# Patient Record
Sex: MALE | Race: WHITE | NOT HISPANIC OR LATINO | Employment: OTHER | URBAN - METROPOLITAN AREA
[De-identification: names, ages, dates, MRNs, and addresses within clinical notes are randomized per-mention and may not be internally consistent; named-entity substitution may affect disease eponyms.]

---

## 2019-03-18 DIAGNOSIS — M79.671 BILATERAL FOOT PAIN: Primary | ICD-10-CM

## 2019-03-18 DIAGNOSIS — M79.672 BILATERAL FOOT PAIN: Primary | ICD-10-CM

## 2019-03-20 ENCOUNTER — HOSPITAL ENCOUNTER (OUTPATIENT)
Dept: RADIOLOGY | Facility: HOSPITAL | Age: 79
Discharge: HOME | End: 2019-03-20
Attending: ORTHOPAEDIC SURGERY
Payer: MEDICARE

## 2019-03-20 ENCOUNTER — OFFICE VISIT (OUTPATIENT)
Dept: ORTHOPEDICS | Facility: CLINIC | Age: 79
End: 2019-03-20
Payer: MEDICARE

## 2019-03-20 VITALS — WEIGHT: 185 LBS | BODY MASS INDEX: 25.9 KG/M2 | HEIGHT: 71 IN

## 2019-03-20 DIAGNOSIS — M79.672 BILATERAL FOOT PAIN: ICD-10-CM

## 2019-03-20 DIAGNOSIS — G57.63 MORTON'S NEUROMA OF BOTH FEET: ICD-10-CM

## 2019-03-20 DIAGNOSIS — M19.079 ARTHRITIS OF FOOT: Primary | ICD-10-CM

## 2019-03-20 DIAGNOSIS — M79.671 BILATERAL FOOT PAIN: ICD-10-CM

## 2019-03-20 PROCEDURE — 99203 PR OFFICE/OUTPT VISIT, NEW, LEVL III, 30-44 MIN: ICD-10-PCS | Mod: S$GLB,,, | Performed by: ORTHOPAEDIC SURGERY

## 2019-03-20 PROCEDURE — 73630 X-RAY EXAM OF FOOT: CPT | Mod: 26,50,, | Performed by: RADIOLOGY

## 2019-03-20 PROCEDURE — 73630 X-RAY EXAM OF FOOT: CPT | Mod: 50,TC,PN

## 2019-03-20 PROCEDURE — 99203 OFFICE O/P NEW LOW 30 MIN: CPT | Mod: S$GLB,,, | Performed by: ORTHOPAEDIC SURGERY

## 2019-03-20 PROCEDURE — 73630 XR FOOT COMPLETE 3 VIEW BILATERAL: ICD-10-PCS | Mod: 26,50,, | Performed by: RADIOLOGY

## 2019-03-20 PROCEDURE — 1101F PT FALLS ASSESS-DOCD LE1/YR: CPT | Mod: CPTII,S$GLB,, | Performed by: ORTHOPAEDIC SURGERY

## 2019-03-20 PROCEDURE — 99999 PR PBB SHADOW E&M-EST. PATIENT-LVL II: CPT | Mod: PBBFAC,,, | Performed by: ORTHOPAEDIC SURGERY

## 2019-03-20 PROCEDURE — 99999 PR PBB SHADOW E&M-EST. PATIENT-LVL II: ICD-10-PCS | Mod: PBBFAC,,, | Performed by: ORTHOPAEDIC SURGERY

## 2019-03-20 PROCEDURE — 1101F PR PT FALLS ASSESS DOC 0-1 FALLS W/OUT INJ PAST YR: ICD-10-PCS | Mod: CPTII,S$GLB,, | Performed by: ORTHOPAEDIC SURGERY

## 2019-03-20 RX ORDER — ASPIRIN 81 MG/1
81 TABLET ORAL DAILY
COMMUNITY

## 2019-03-20 RX ORDER — AMLODIPINE AND BENAZEPRIL HYDROCHLORIDE 10; 20 MG/1; MG/1
CAPSULE ORAL
COMMUNITY
Start: 2019-01-23

## 2019-03-20 RX ORDER — HYDROCHLOROTHIAZIDE 12.5 MG/1
25 CAPSULE ORAL DAILY
COMMUNITY
Start: 2019-01-23

## 2019-03-20 RX ORDER — ATORVASTATIN CALCIUM 40 MG/1
40 TABLET, FILM COATED ORAL DAILY
COMMUNITY
Start: 2019-01-23

## 2019-03-20 RX ORDER — METOPROLOL TARTRATE 25 MG/1
25 TABLET, FILM COATED ORAL 2 TIMES DAILY
COMMUNITY
Start: 2019-01-23

## 2019-03-20 RX ORDER — DOCUSATE SODIUM 100 MG/1
100 CAPSULE, LIQUID FILLED ORAL 2 TIMES DAILY
COMMUNITY

## 2019-03-20 NOTE — PROGRESS NOTES
"  Subjective:      Patient ID: Yariel Maguire is a 78 y.o. male.    Chief Complaint: Pain of the Left Foot and Pain of the Right Foot    Referring Provider: Aaareferral Self  No address on file    HPI:  Mr. Maguire is extremely angry argumentative 78-year-old male who presented today for evaluation of bilateral foot pain which began approximately 2 years a grow and increased in intensity over the last 6 months.  He stated his left foot equals his right.  His symptoms are insidious onset.  He stated, it feels like there is a bone loose when it happens."  His pain is not constant it intermittent but when he has an episode it inhibits ambulation ability.  Walking increases his symptoms while nothing seems to improve it. He has not taken NSAIDs but was given a steroid Dosepak which helped.  He has not done physical therapy, worn braces are arch supports, nor had injections.    Past Medical History:   Diagnosis Date    Constipation      *  *  *  * Hypertension  Nephrolithiasis  Headaches  Coronary artery disease  Meniere's disease      Past Surgical History:   Procedure Laterality Date    COLONOSCOPY      EXCISIONAL HEMORRHOIDECTOMY      GASTRIC BYPASS      SHOULDER ARTHROSCOPY Left      *  *  * Rotator Cuff  THREE VESSEL CABG  BILATERAL CATARACT REMOVAL  SECOND AND 3RD TOE HAMMERTOE CORRECTION RIGHT FOOT       Review of patient's allergies indicates:   Allergen Reactions    Potassium        Social History     Occupational History    Semi retired /entertainer     Tobacco Use    Smoking status: Never Smoker    Smokeless tobacco: Never Used   Substance and Sexual Activity    Alcohol use: No     Frequency: Never    Drug use: No    Sexual activity: Yes     Partners: Female      Family History   Problem Relation Age of Onset    Cancer Mother     Alcohol abuse Father     Lung cancer Brother     Obesity Brother     Arthritis Brother     Drug abuse Brother     Lung cancer Son     Mental illness Daughter "     Schizophrenia Daughter     Bipolar disorder Daughter     Arthritis Daughter        Previous Hospitalizations:  3 vessel CABG, hemorrhoidectomy.    ROS:   Review of Systems   Constitution: Negative for chills and fever.   HENT: Negative for congestion.    Eyes: Negative for blurred vision.   Cardiovascular: Negative for chest pain.   Respiratory: Negative for cough.    Endocrine: Negative for polydipsia.   Hematologic/Lymphatic: Does not bruise/bleed easily.   Skin: Negative for skin cancer.   Musculoskeletal: Negative for gout.   Gastrointestinal: Negative for constipation, diarrhea and heartburn.   Genitourinary: Negative for nocturia.   Neurological: Positive for headaches. Negative for seizures.   Psychiatric/Behavioral: Negative for depression.   Allergic/Immunologic: Negative for environmental allergies.           Objective:      Physical Exam:   General:  Angry, argumentative, AAOx3.  No acute distress  HEENT: Normocephalic, PEARLA EOMI, fair dentition  Neck: Supple, No JVD  Chest: Symetric, equal excursion on inspiration  Abdomen: Soft NTND  Vascular:  Pulses intact and equal bilaterally.  Capillary refill less than 3 seconds and equal bilaterally  Neurologic:  Pinprick and soft touch intact and equal bilaterally  Integment:  No ecchymosis, no errythema  Extremity:  Ankle/foot:  Dorsiflexion/plantar flexion equal bilaterally 20/30 degrees. Inversion/eversion equal bilaterally. Nontender at the ATFL bilateral ankles.  Nontender over the deltoid ligament bilateral ankles.  Equal excursion with forced adduction both ankles.  Drawer equal bilaterally. Nontender at the Lisfranc interval both feet.  Tender at the metatarsals heads 1st interspace, right foot and 2nd interspace left foot. Mild swelling at the metatarsal heads at the 1st interspace right foot and 2nd interspace left foot. Positive squeeze test bilaterally. When last negative bilaterally.  Nontender at the plantar fascia insertion on the  Achilles both feet.  Radiography:  Personally reviewed x-rays of the left foot completed on 03/20/2019 which showed sesamoid arthritis and talonavicular arthritis with a bone spur there is also calcification of the dorsalis pedis artery.      Assessment:       Impression:      1.  2.  3. 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  Barcenas's neuroma 2nd interspace, left foot.  Foot arthritis.         Plan:       1.  Discussed physical examination and radiographic findings with the patient. Yariel understands that he has Barcenas's neuromas and with conservative management he should improve.  The patient was are very argumentative.   2.  Offered a steroid injection to the Barcenas's neuromas, he declined.  3.  Recommend the patient purchase arch supports in where the min issues.  4.  Recommend the patient purchase wide toed shoes and avoid wearing shoes such as cowboy boots.  5.  Take NSAIDs as tolerated and allowed by PCM.  6.  Home exercises to include Aspercreme massages were discussed.  7.  Offered referral to physical therapy declined.  8.  [facility] portal was discussed with the patient and information was given.  The patient was encouraged to use the portal for future encounters.  9.  Follow up p.r.n.

## 2019-09-19 DIAGNOSIS — I25.10 CORONARY ARTERY DISEASE, ANGINA PRESENCE UNSPECIFIED, UNSPECIFIED VESSEL OR LESION TYPE, UNSPECIFIED WHETHER NATIVE OR TRANSPLANTED HEART: ICD-10-CM

## 2019-09-19 DIAGNOSIS — I35.0 SEVERE AORTIC STENOSIS: Primary | ICD-10-CM

## 2019-11-18 ENCOUNTER — HOSPITAL ENCOUNTER (OUTPATIENT)
Dept: CARDIOLOGY | Facility: CLINIC | Age: 79
Discharge: HOME | End: 2019-11-18
Attending: INTERNAL MEDICINE
Payer: MEDICARE

## 2019-11-18 ENCOUNTER — HOSPITAL ENCOUNTER (OUTPATIENT)
Dept: RADIOLOGY | Facility: HOSPITAL | Age: 79
Discharge: HOME | End: 2019-11-18
Attending: INTERNAL MEDICINE
Payer: MEDICARE

## 2019-11-18 ENCOUNTER — OFFICE VISIT (OUTPATIENT)
Dept: CARDIOLOGY | Facility: CLINIC | Age: 79
End: 2019-11-18
Payer: MEDICARE

## 2019-11-18 ENCOUNTER — HOSPITAL ENCOUNTER (OUTPATIENT)
Dept: CARDIOLOGY | Facility: CLINIC | Age: 79
Discharge: HOME | End: 2019-11-18
Payer: MEDICARE

## 2019-11-18 ENCOUNTER — HOSPITAL ENCOUNTER (OUTPATIENT)
Dept: PULMONOLOGY | Facility: CLINIC | Age: 79
Discharge: HOME | End: 2019-11-18
Payer: MEDICARE

## 2019-11-18 VITALS
WEIGHT: 190.25 LBS | BODY MASS INDEX: 26.64 KG/M2 | OXYGEN SATURATION: 99 % | HEART RATE: 63 BPM | DIASTOLIC BLOOD PRESSURE: 81 MMHG | SYSTOLIC BLOOD PRESSURE: 167 MMHG | HEIGHT: 71 IN

## 2019-11-18 VITALS — BODY MASS INDEX: 27.85 KG/M2 | HEIGHT: 69 IN | WEIGHT: 188 LBS

## 2019-11-18 VITALS
WEIGHT: 188 LBS | HEIGHT: 69 IN | HEART RATE: 64 BPM | SYSTOLIC BLOOD PRESSURE: 160 MMHG | BODY MASS INDEX: 27.85 KG/M2 | DIASTOLIC BLOOD PRESSURE: 80 MMHG

## 2019-11-18 DIAGNOSIS — I50.32 CHRONIC DIASTOLIC HEART FAILURE: ICD-10-CM

## 2019-11-18 DIAGNOSIS — I35.0 SEVERE AORTIC STENOSIS: Primary | ICD-10-CM

## 2019-11-18 DIAGNOSIS — I35.0 SEVERE AORTIC STENOSIS: ICD-10-CM

## 2019-11-18 DIAGNOSIS — I25.10 CORONARY ARTERY DISEASE, ANGINA PRESENCE UNSPECIFIED, UNSPECIFIED VESSEL OR LESION TYPE, UNSPECIFIED WHETHER NATIVE OR TRANSPLANTED HEART: ICD-10-CM

## 2019-11-18 DIAGNOSIS — I10 ESSENTIAL HYPERTENSION: ICD-10-CM

## 2019-11-18 DIAGNOSIS — E78.2 MIXED HYPERLIPIDEMIA: ICD-10-CM

## 2019-11-18 LAB
ASCENDING AORTA: 3.57 CM
AV INDEX (PROSTH): 0.17
AV MEAN GRADIENT: 43 MMHG
AV PEAK GRADIENT: 74 MMHG
AV VALVE AREA: 0.66 CM2
AV VELOCITY RATIO: 0.23
BSA FOR ECHO PROCEDURE: 2.04 M2
CV ECHO LV RWT: 0.45 CM
DLCO ADJ PRE: 21.21 ML/(MIN*MMHG) (ref 17.75–31.61)
DLCO SINGLE BREATH LLN: 17.75
DLCO SINGLE BREATH PRE REF: 85.9 %
DLCO SINGLE BREATH REF: 24.68
DLCOC SBVA LLN: 2.4
DLCOC SBVA PRE REF: 89.2 %
DLCOC SBVA REF: 3.58
DLCOC SINGLE BREATH LLN: 17.75
DLCOC SINGLE BREATH PRE REF: 85.9 %
DLCOC SINGLE BREATH REF: 24.68
DLCOCSBVAULN: 4.75
DLCOCSINGLEBREATHULN: 31.61
DLCOSINGLEBREATHULN: 31.61
DLCOVA LLN: 2.4
DLCOVA PRE REF: 89.2 %
DLCOVA PRE: 3.19 ML/(MIN*MMHG*L) (ref 2.4–4.75)
DLCOVA REF: 3.58
DLCOVAULN: 4.75
DLVAADJ PRE: 3.19 ML/(MIN*MMHG*L) (ref 2.4–4.75)
DOP CALC AO PEAK VEL: 4.3 M/S
DOP CALC AO VTI: 101.4 CM
DOP CALC LVOT AREA: 3.8 CM2
DOP CALC LVOT DIAMETER: 2.2 CM
DOP CALC LVOT PEAK VEL: 0.98 M/S
DOP CALC LVOT STROKE VOLUME: 66.49 CM3
DOP CALCLVOT PEAK VEL VTI: 17.5 CM
E WAVE DECELERATION TIME: 168.86 MSEC
E/A RATIO: 1.35
E/E' RATIO: 17 M/S
ECHO LV POSTERIOR WALL: 1.14 CM (ref 0.6–1.1)
FEF 25 75 LLN: 0.78
FEF 25 75 PRE REF: 128.1 %
FEF 25 75 REF: 2.03
FEV05 LLN: 1.27
FEV05 REF: 2.4
FEV1 FVC LLN: 60
FEV1 FVC PRE REF: 102.1 %
FEV1 FVC REF: 75
FEV1 LLN: 1.97
FEV1 PRE REF: 110 %
FEV1 REF: 2.82
FRACTIONAL SHORTENING: 28 % (ref 28–44)
FVC LLN: 2.75
FVC PRE REF: 107 %
FVC REF: 3.79
INTERVENTRICULAR SEPTUM: 1.26 CM (ref 0.6–1.1)
IVC PRE: 4.05 L (ref 2.75–4.84)
IVC SINGLE BREATH LLN: 2.75
IVC SINGLE BREATH PRE REF: 107 %
IVC SINGLE BREATH REF: 3.79
IVCSINGLEBREATHULN: 4.84
LA MAJOR: 6.52 CM
LA MINOR: 6.57 CM
LA WIDTH: 5.71 CM
LEFT ATRIUM SIZE: 6.4 CM
LEFT ATRIUM VOLUME INDEX: 101 ML/M2
LEFT ATRIUM VOLUME: 203.3 CM3
LEFT INTERNAL DIMENSION IN SYSTOLE: 3.64 CM (ref 2.1–4)
LEFT VENTRICLE DIASTOLIC VOLUME INDEX: 60.85 ML/M2
LEFT VENTRICLE DIASTOLIC VOLUME: 122.43 ML
LEFT VENTRICLE MASS INDEX: 119 G/M2
LEFT VENTRICLE SYSTOLIC VOLUME INDEX: 27.8 ML/M2
LEFT VENTRICLE SYSTOLIC VOLUME: 55.91 ML
LEFT VENTRICULAR INTERNAL DIMENSION IN DIASTOLE: 5.08 CM (ref 3.5–6)
LEFT VENTRICULAR MASS: 239.73 G
LV LATERAL E/E' RATIO: 13.6 M/S
LV SEPTAL E/E' RATIO: 22.67 M/S
MV PEAK A VEL: 1.01 M/S
MV PEAK E VEL: 1.36 M/S
MVV LLN: 94
MVV PRE REF: 115.4 %
MVV REF: 111
PEF LLN: 4.99
PEF PRE REF: 123.6 %
PEF REF: 7.23
PISA TR MAX VEL: 2.61 M/S
PRE DLCO: 21.21 ML/(MIN*MMHG) (ref 17.75–31.61)
PRE FEF 25 75: 2.6 L/S (ref 0.78–3.27)
PRE FET 100: 7.04 SEC
PRE FEV05 REF: 106.5 %
PRE FEV1 FVC: 76.55 % (ref 60.41–89.62)
PRE FEV1: 3.1 L (ref 1.97–3.68)
PRE FEV5: 2.56 L (ref 1.27–3.54)
PRE FVC: 4.06 L (ref 2.75–4.84)
PRE MVV: 128 L/MIN (ref 94.28–127.56)
PRE PEF: 8.94 L/S (ref 4.99–9.48)
PULM VEIN S/D RATIO: 1.04
PV PEAK D VEL: 0.48 M/S
PV PEAK S VEL: 0.5 M/S
RA MAJOR: 4.76 CM
RA PRESSURE: 3 MMHG
RA WIDTH: 3.28 CM
RIGHT VENTRICULAR END-DIASTOLIC DIMENSION: 2.79 CM
SINUS: 3.51 CM
STJ: 3.04 CM
TDI LATERAL: 0.1 M/S
TDI SEPTAL: 0.06 M/S
TDI: 0.08 M/S
TR MAX PG: 27 MMHG
TRICUSPID ANNULAR PLANE SYSTOLIC EXCURSION: 1 CM
TV REST PULMONARY ARTERY PRESSURE: 30 MMHG
VA PRE: 6.66 L (ref 6.75–6.75)
VA SINGLE BREATH LLN: 6.75
VA SINGLE BREATH PRE REF: 98.6 %
VA SINGLE BREATH REF: 6.75
VASINGLEBREATHULN: 6.75

## 2019-11-18 PROCEDURE — 74174 CTA CARDIAC TAVR_PARTNERS (XPD): ICD-10-PCS | Mod: 26,,, | Performed by: RADIOLOGY

## 2019-11-18 PROCEDURE — 94618 PULMONARY STRESS TESTING: ICD-10-PCS | Mod: S$GLB,,, | Performed by: INTERNAL MEDICINE

## 2019-11-18 PROCEDURE — 99205 OFFICE O/P NEW HI 60 MIN: CPT | Mod: S$GLB,,, | Performed by: THORACIC SURGERY (CARDIOTHORACIC VASCULAR SURGERY)

## 2019-11-18 PROCEDURE — 74174 CTA ABD&PLVS W/CONTRAST: CPT | Mod: 26,,, | Performed by: RADIOLOGY

## 2019-11-18 PROCEDURE — 1101F PT FALLS ASSESS-DOCD LE1/YR: CPT | Mod: CPTII,S$GLB,, | Performed by: THORACIC SURGERY (CARDIOTHORACIC VASCULAR SURGERY)

## 2019-11-18 PROCEDURE — 71275 CT ANGIOGRAPHY CHEST: CPT | Mod: TC

## 2019-11-18 PROCEDURE — 93306 TTE W/DOPPLER COMPLETE: CPT | Mod: S$GLB,,, | Performed by: INTERNAL MEDICINE

## 2019-11-18 PROCEDURE — 99999 PR PBB SHADOW E&M-EST. PATIENT-LVL III: CPT | Mod: PBBFAC,,,

## 2019-11-18 PROCEDURE — 99204 OFFICE O/P NEW MOD 45 MIN: CPT | Mod: S$GLB,,, | Performed by: INTERNAL MEDICINE

## 2019-11-18 PROCEDURE — 1101F PT FALLS ASSESS-DOCD LE1/YR: CPT | Mod: CPTII,S$GLB,, | Performed by: INTERNAL MEDICINE

## 2019-11-18 PROCEDURE — 99204 PR OFFICE/OUTPT VISIT, NEW, LEVL IV, 45-59 MIN: ICD-10-PCS | Mod: S$GLB,,, | Performed by: INTERNAL MEDICINE

## 2019-11-18 PROCEDURE — 93306 ECHO (CUPID ONLY): ICD-10-PCS | Mod: S$GLB,,, | Performed by: INTERNAL MEDICINE

## 2019-11-18 PROCEDURE — 93010 ELECTROCARDIOGRAM REPORT: CPT | Mod: S$GLB,,, | Performed by: INTERNAL MEDICINE

## 2019-11-18 PROCEDURE — 71275 CTA CARDIAC TAVR_PARTNERS (XPD): ICD-10-PCS | Mod: 26,,, | Performed by: RADIOLOGY

## 2019-11-18 PROCEDURE — 94010 BREATHING CAPACITY TEST: ICD-10-PCS | Mod: S$GLB,,, | Performed by: INTERNAL MEDICINE

## 2019-11-18 PROCEDURE — 93005 ELECTROCARDIOGRAM TRACING: CPT | Mod: S$GLB,,, | Performed by: INTERNAL MEDICINE

## 2019-11-18 PROCEDURE — 94729 DIFFUSING CAPACITY: CPT | Mod: S$GLB,,, | Performed by: INTERNAL MEDICINE

## 2019-11-18 PROCEDURE — 93010 EKG 12-LEAD: ICD-10-PCS | Mod: S$GLB,,, | Performed by: INTERNAL MEDICINE

## 2019-11-18 PROCEDURE — 1101F PR PT FALLS ASSESS DOC 0-1 FALLS W/OUT INJ PAST YR: ICD-10-PCS | Mod: CPTII,S$GLB,, | Performed by: INTERNAL MEDICINE

## 2019-11-18 PROCEDURE — 99205 PR OFFICE/OUTPT VISIT, NEW, LEVL V, 60-74 MIN: ICD-10-PCS | Mod: S$GLB,,, | Performed by: THORACIC SURGERY (CARDIOTHORACIC VASCULAR SURGERY)

## 2019-11-18 PROCEDURE — 94618 PULMONARY STRESS TESTING: CPT | Mod: S$GLB,,, | Performed by: INTERNAL MEDICINE

## 2019-11-18 PROCEDURE — 25500020 PHARM REV CODE 255: Performed by: INTERNAL MEDICINE

## 2019-11-18 PROCEDURE — 99999 PR PBB SHADOW E&M-EST. PATIENT-LVL III: ICD-10-PCS | Mod: PBBFAC,,,

## 2019-11-18 PROCEDURE — 93005 EKG 12-LEAD: ICD-10-PCS | Mod: S$GLB,,, | Performed by: INTERNAL MEDICINE

## 2019-11-18 PROCEDURE — 94729 PR C02/MEMBANE DIFFUSE CAPACITY: ICD-10-PCS | Mod: S$GLB,,, | Performed by: INTERNAL MEDICINE

## 2019-11-18 PROCEDURE — 1101F PR PT FALLS ASSESS DOC 0-1 FALLS W/OUT INJ PAST YR: ICD-10-PCS | Mod: CPTII,S$GLB,, | Performed by: THORACIC SURGERY (CARDIOTHORACIC VASCULAR SURGERY)

## 2019-11-18 PROCEDURE — 94010 BREATHING CAPACITY TEST: CPT | Mod: S$GLB,,, | Performed by: INTERNAL MEDICINE

## 2019-11-18 PROCEDURE — 71275 CT ANGIOGRAPHY CHEST: CPT | Mod: 26,,, | Performed by: RADIOLOGY

## 2019-11-18 RX ORDER — AMLODIPINE BESYLATE 5 MG/1
TABLET ORAL
Refills: 1 | COMMUNITY
Start: 2019-09-12

## 2019-11-18 RX ORDER — FLAXSEED OIL 1000 MG
1 CAPSULE ORAL DAILY
COMMUNITY

## 2019-11-18 RX ORDER — BENAZEPRIL HYDROCHLORIDE 20 MG/1
20 TABLET ORAL DAILY
COMMUNITY

## 2019-11-18 RX ADMIN — IOHEXOL 100 ML: 350 INJECTION, SOLUTION INTRAVENOUS at 12:11

## 2019-11-18 NOTE — PROGRESS NOTES
Subjective:    Patient ID:  Yariel Maguire is a 79 y.o. male who presents for evaluation of severe aortic stenosis.    Referred by: Dr. 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 He presents to clinic today for evaluation of severe aortic stenosis. He notes one episode of bilateral LE swelling recently that prompted Primary Cardiologist appointment, where severe AS was found. His wife reports he seems more tired than in years past however patient denies this. He denies shortness of breath, chest pain, or ONEILL. He reports that he rides bike ~ 5 miles once every two weeks or so and is shoveling a truckload of dirt currently via shovel and wheelbarrow without symptoms.     Yariel Maguire is a 79 y.o. male referred by Dr. Manuelito Beverly for evaluation of severe AS (NYHA Class I sx and CCS 0).    The patient has undergone the following TAVR work-up:   ECHO (Date 11/18/2019): RUSLAN= 0.66 cm2, MG= 43 mmHg, Peak Shree= 4.3 m/s, EF= 65%.   LHC (Date): 2019: Normal LM and LCX.  Patent LIMA to LAD and SVG to PDA.  STS: 1.5%   Frailty: 0/4   Iliacs are >7.59 on R and > 8.51on L   LVOT area by CTA is 5.40 cm2 (30.8 mm X 22.1 mm) and Avg Diameter is 26.2 per Dr. Drummond  Incidental findings on CT: Cholelithiasis without evidence of acute cholecystitis.  CT Surgery risk assessment: moderate risk, per Dr. Villatoro due to redo sternotomy and co-morbidities.  Rhythm issues: NSR with 1st degree AVB and frequent PVCs  PFTs: FEV1 110% predicted, DLCO 86% predicted.  Comorbidities: CAD s/p CABG, HTN, and HLD    Yariel Maguire is a 29mm S3 -2cc (20% OS) valve candidate via LTF access.   RETURN TO CLINIC IN 6 MONTHS WITH REPEAT TTE, CURRENTLY ASYMPTOMATIC.      Past Medical History:   Diagnosis Date    Constipation     Coronary artery disease     Hyperlipidemia     Hypertension     S/P CABG (coronary artery bypass graft)      Past  Surgical History:   Procedure Laterality Date    COLONOSCOPY      EXCISIONAL HEMORRHOIDECTOMY      GASTRIC BYPASS      SHOULDER ARTHROSCOPY Left     Rotator Cuff     Current Outpatient Medications:     aspirin (ECOTRIN) 81 MG EC tablet, Take 81 mg by mouth once daily., Disp: , Rfl:     atorvastatin (LIPITOR) 40 MG tablet, Take 40 mg by mouth once daily. , Disp: , Rfl:     hydroCHLOROthiazide (MICROZIDE) 12.5 mg capsule, Take 25 mg by mouth once daily. , Disp: , Rfl:     metoprolol tartrate (LOPRESSOR) 25 MG tablet, Take 25 mg by mouth 2 (two) times daily. , Disp: , Rfl:     amlodipine-benazepril 10-20mg (LOTREL) 10-20 mg per capsule, , Disp: , Rfl:     docusate sodium (COLACE) 100 MG capsule, Take 100 mg by mouth 2 (two) times daily., Disp: , Rfl:   No current facility-administered medications for this visit.     Vitals:    11/18/19 1527   BP: (!) 167/81   Pulse: 63     Review of Systems   Constitution: Negative for decreased appetite, malaise/fatigue, weight gain and weight loss.   HENT: Negative for congestion, hearing loss, hoarse voice and sore throat.    Eyes: Negative for blurred vision and double vision.   Cardiovascular: Negative for chest pain, claudication, dyspnea on exertion, irregular heartbeat, leg swelling, near-syncope, palpitations, paroxysmal nocturnal dyspnea and syncope.   Respiratory: Negative for cough, shortness of breath, sputum production and wheezing.    Endocrine: Negative for cold intolerance and heat intolerance.   Hematologic/Lymphatic: Does not bruise/bleed easily.   Skin: Negative for color change and poor wound healing.   Musculoskeletal: Negative for arthritis, back pain and falls.   Gastrointestinal: Negative for bloating, abdominal pain, change in bowel habit, bowel incontinence, constipation, diarrhea, dysphagia, heartburn, nausea and vomiting.   Genitourinary: Negative for bladder incontinence, dysuria, frequency, hematuria, incomplete emptying and urgency.  "  Neurological: Negative for disturbances in coordination, dizziness, headaches, light-headedness, loss of balance, numbness and weakness.   Psychiatric/Behavioral: Negative for depression. The patient does not have insomnia and is not nervous/anxious.    Allergic/Immunologic: Negative for persistent infections.     Past Medical History:   Diagnosis Date    Constipation     Coronary artery disease     Hyperlipidemia     Hypertension     S/P CABG (coronary artery bypass graft)      Current Outpatient Medications on File Prior to Visit   Medication Sig Dispense Refill    amLODIPine (NORVASC) 5 MG tablet TK 1 T PO QD  1    aspirin (ECOTRIN) 81 MG EC tablet Take 81 mg by mouth once daily.      atorvastatin (LIPITOR) 40 MG tablet Take 40 mg by mouth once daily.       benazepril (LOTENSIN) 20 MG tablet Take 20 mg by mouth once daily.      flaxseed oil 1,000 mg Cap Take 1 capsule by mouth once daily.      hydroCHLOROthiazide (MICROZIDE) 12.5 mg capsule Take 25 mg by mouth once daily.       metoprolol tartrate (LOPRESSOR) 25 MG tablet Take 25 mg by mouth 2 (two) times daily.       amlodipine-benazepril 10-20mg (LOTREL) 10-20 mg per capsule       docusate sodium (COLACE) 100 MG capsule Take 100 mg by mouth 2 (two) times daily.       No current facility-administered medications on file prior to visit.      Vitals:    11/18/19 1522 11/18/19 1527   BP: (!) 174/80 (!) 167/81   BP Location: Right arm Left arm   Patient Position: Sitting Sitting   BP Method: Large (Automatic) Large (Automatic)   Pulse: 63 63   SpO2: 99%    Weight: 86.3 kg (190 lb 4.1 oz)    Height: 5' 10.5" (1.791 m)      Body mass index is 26.91 kg/m².         Objective:    Physical Exam   Constitutional: He is oriented to person, place, and time. He appears well-developed and well-nourished. No distress.   HENT:   Head: Normocephalic and atraumatic.   Mouth/Throat: No oropharyngeal exudate.   Eyes: Conjunctivae and EOM are normal. No scleral " icterus.   Neck: Normal range of motion. Neck supple. No JVD present.   Cardiovascular: Normal rate, regular rhythm and intact distal pulses.   Murmur heard.  Pulmonary/Chest: Effort normal and breath sounds normal. No respiratory distress. He has no wheezes.   Abdominal: Soft. Bowel sounds are normal. He exhibits no distension. There is no tenderness.   Musculoskeletal: Normal range of motion. He exhibits no edema.   Neurological: He is alert and oriented to person, place, and time.   Skin: Skin is warm and dry. No rash noted. He is not diaphoretic. No erythema.   Psychiatric: He has a normal mood and affect. His behavior is normal. Judgment normal.   Nursing note and vitals reviewed.        Assessment:       1. Severe aortic stenosis    2. Chronic diastolic heart failure    3. Essential hypertension    4. Mixed hyperlipidemia         Plan:       Severe aortic stenosis  Yariel Maguire is a 79 y.o. male referred by Dr. Manuelito Beverly for evaluation of severe AS (NYHA Class I sx and CCS 0).    The patient has undergone the following TAVR work-up:   ECHO (Date 11/18/2019): RUSLAN= 0.66 cm2, MG= 43 mmHg, Peak Shree= 4.3 m/s, EF= 65%.   LHC (Date): 2019: Normal LM and LCX.  Patent LIMA to LAD and SVG to PDA.  STS: 1.5%   Frailty: 0/4   Iliacs are >7.59 on R and > 8.51on L   LVOT area by CTA is 5.40 cm2 (30.8 mm X 22.1 mm) and Avg Diameter is 26.2 per Dr. Drummond  Incidental findings on CT: Cholelithiasis without evidence of acute cholecystitis.  CT Surgery risk assessment: moderate risk, per Dr. Villatoro due to redo sternotomy and co-morbidities.  Rhythm issues: NSR with 1st degree AVB and frequent PVCs  PFTs: FEV1 110% predicted, DLCO 86% predicted.  Comorbidities: CAD s/p CABG, HTN, and HLD    Yariel Maguire is a 29mm S3 -2cc (20% OS) valve candidate via LTF access.     Patient is interested in being screened and enrolled in Early TAVR.  I will let our research folks know to call him.    Chronic diastolic heart failure  -compensated on  exam today  -on amlodipine, benazepril, HCTZ, and metoprolol    Essential hypertension  -chronic  -see CHF for medications    Mixed hyperlipidemia  -chronic  -on statin    Severe Hearing Impairment  -Consider hearing aids      Millie Rendon, DIMITRIOS, AG-ACNP, BC  Interventional Cardiology   [facility]    Staff:  I have personally taken the history and examined this patient and agree with the fellow's note as stated above and amended it accordingly :-) He is a potential EARLY TAVR candidate and would like to be screened and consented.

## 2019-11-18 NOTE — ASSESSMENT & PLAN NOTE
Yariel Maguire is a 79 y.o. male referred by Dr. Manuelito Beverly for evaluation of severe AS (NYHA Class I sx and CCS 0).    The patient has undergone the following TAVR work-up:   ECHO (Date 11/18/2019): RUSLAN= 0.66 cm2, MG= 43 mmHg, Peak Shree= 4.3 m/s, EF= 65%.   LHC (Date): by Dr. Beverly at outside facility, films on [facility]   STS: **%   Frailty: 0/4   Iliacs are >7.59 on R and > 8.51on L   LVOT area by CTA is 5.40 cm2 (30.8 mm X 22.1 mm) and Avg Diameter is 26.2 per Dr. Drummond  Incidental findings on CT:  Right lower lobe pulmonary micronodule which may be a noncalcified granuloma (axial series 2, image 51).  For a solid nodule <6 mm, Fleischner Society 2017 guidelines recommend no routine follow up for a low risk patient, or follow-up with non-contrast chest CT at 12 months in a high risk patient.  Cholelithiasis without evidence of acute cholecystitis.  Mild prominence of the common bile duct and central pancreatic duct.  No associated stone or other obvious etiology.  Age advanced fatty involution of the pancreas.  Superimposed scattered punctate calcifications compatible with chronic pancreatitis.  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  Small hiatal hernia    CT Surgery risk assessment: moderate risk, per Dr. Villatoro due to redo sternotomy and co-morbidities.  Rhythm issues: NSR with 1st degree AVB and frequent PVCs  PFTs: FEV1 110% predicted, DLCO 86% predicted.  6 minute walk test 11/18/2019: competed without stopping, walked 360 seconds, no complaints or assistive devices. Distance walked 466.95 meters (1532 feet).  Comorbidities: CAD s/p CABG, HTN, and HLD    Yariel Maguire is a 29mm S3 -2cc (20% OS) valve candidate via LTF access.    -RETURN TO CLINIC IN 6 MONTHS WITH REPEAT TTE, CURRENTLY ASYMPTOMATIC.   -continue to follow with primary Cardiologist Dr. Beverly for chronic diseases; can return to Structural Cardiology clinic sooner than 6 months if becomes symptomatic

## 2019-11-18 NOTE — PROCEDURES
Yariel Maguire is a 79 y.o.  male patient, who presents for a 6 minute walk test ordered by MD Hoda.  The diagnosis is Aortic Valve Disorder.  The patient's BMI is 27.8 kg/m2.  Predicted distance (lower limit of normal) is 289.72 meters.      Test Results:    The test was completed without stopping.    The total time walked was 360 seconds.  During walking, the patient reported:  No complaints. The patient used no assistive devices  during testing.     11/18/2019---------Distance: 466.95 meters (1532 feet)     O2 Sat % Supplemental Oxygen Heart Rate Blood Pressure Timothy Scale   Pre-exercise  (Resting) 98 % Room Air 63 bpm 161/78 mmHg 0   During Exercise 100 % Room Air 74 bpm 168/76 mmHg 0   Post-exercise  (Recovery) 98 % Room Air  69 bpm   mmHg       Recovery Time: 188 seconds    Performing nurse/tech: David GALDAMEZ      PREVIOUS STUDY:   The patient has not had a previous study.      CLINICAL INTERPRETATION:  Six minute walk distance is 466.95 meters (1532 feet) with no dyspnea.  During exercise, there was no significant desaturation while breathing room air.  Both blood pressure and heart rate remained stable with walking.  Hypertension was present prior to exercise.  The patient did not report non-pulmonary symptoms during exercise.  No previous study performed.  Based upon age and body mass index, exercise capacity is normal.

## 2019-11-19 LAB
CREAT SERPL-MCNC: 0.9 MG/DL (ref 0.5–1.4)
SAMPLE: NORMAL

## 2019-11-19 NOTE — PROGRESS NOTES
Cardiothoracic Surgery  Transcatheter Aortic Valve Replacement Evaluation      SUBJECTIVE:     History of Present Illness:    Mr. Maguire is a 79 year old male with past medical history of CAD s/p CABG, HFpEF, HTN, vertigo, and HLD. He presents to clinic today for evaluation of severe aortic stenosis. He notes LE swelling recently that prompted Primary Cardiologist appointment, where severe AS was found. His wife reports he seems more tired than in years past however patient denies this. He denies shortness of breath, chest pain, or ONEILL. He reports that he rides bike ~ 5 miles once every two weeks or so and is shoveling a truckload of dirt currently via shovel and wheelbarrow without symptoms.      Yariel Maguire is a 79 y.o. male referred by Dr. Manuelito Beverly for evaluation of severe AS (NYHA Class I sx and CCS 0).     The patient has undergone the following TAVR work-up:   · ECHO (Date 11/18/2019): RUSLAN= 0.66 cm2, MG= 43 mmHg, Peak Shree= 4.3 m/s, EF= 65%.   · LHC (Date): by Dr. Beverly at outside facility, films on [facility]   · Frailty: 0/4   · Iliacs are >7.59 on R and > 8.51on L   · LVOT area by CTA is 5.40 cm2 (30.8 mm X 22.1 mm) and Avg Diameter is 26.2 per Dr. Drummond  · Incidental findings on CT:  · Right lower lobe pulmonary micronodule which may be a noncalcified granuloma (axial series 2, image 51).  For a solid nodule <6 mm, Fleischner Society 2017 guidelines recommend no routine follow up for a low risk patient, or follow-up with non-contrast chest CT at 12 months in a high risk patient.  · Cholelithiasis without evidence of acute cholecystitis.  · Mild prominence of the common bile duct and central pancreatic duct.  No associated stone or other obvious etiology.  · Age advanced fatty involution of the pancreas.  Superimposed scattered punctate calcifications compatible with chronic pancreatitis.  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  · Small hiatal hernia    · Rhythm issues: NSR with 1st degree AVB  and frequent PVCs  · PFTs: FEV1 110% predicted, DLCO 86% predicted.  · 6 minute walk test 11/18/2019: competed without stopping, walked 360 seconds, no complaints or assistive devices. Distance walked 466.95 meters (1532 feet).  · Comorbidities: CAD s/p CABG, HTN, and HLD     Yariel Maguire is a 29mm S3 -2cc (20% OS) valve candidate via LTF access.     Past Medical History:   Diagnosis Date    Constipation     Coronary artery disease     Hyperlipidemia     Hypertension     S/P CABG (coronary artery bypass graft)      Past Surgical History:   Procedure Laterality Date    COLONOSCOPY      EXCISIONAL HEMORRHOIDECTOMY      GASTRIC BYPASS      SHOULDER ARTHROSCOPY Left     Rotator Cuff     Family History   Problem Relation Age of Onset    Cancer Mother     Alcohol abuse Father     Lung cancer Brother     Obesity Brother     Arthritis Brother     Drug abuse Brother     Lung cancer Son     Mental illness Daughter     Schizophrenia Daughter     Bipolar disorder Daughter     Arthritis Daughter      Social History     Tobacco Use    Smoking status: Never Smoker    Smokeless tobacco: Never Used   Substance Use Topics    Alcohol use: No     Frequency: Never    Drug use: No      Review of patient's allergies indicates:   Allergen Reactions    Potassium      Current medications reviewed    Review of Systems:  Constitutional: Negative for fever, chills, distress  Skin: no acute disorders.  Negative for rash, itching  HEENT: unremarkable.  Negative for sore throat, congestion  Cardiovascular: Positive for lower extremity edema.  Negative for chest pain, orthopnea  Respiratory:  Negative for shortness of breath, cough.  GI:  unremarkable.  Negative for abdominal pain, vomiting  :  Negative for hematuria, flank pain  Musculoskeletal: unremarkable.  Negative for falls, myalgias  Neuro:  Negative for dizziness, LOC  Psych:  Negative for substance abuse, memory loss      OBJECTIVE:     Vital Signs (Most Recent)      Vital signs reviewed    Physical Exam:  General Appearance: no acute distress.  Normal for age  Skin: no acute lesions, minor bruises from phlebotomy  HEENT: no masses/hematoma, Jugular veins: not distended  Resp:  excursion/effort normal; clear to auscultation  CV:  Rate:  regular  Rhythm: regular  Murmur:  systolic ejection murmur at right upper sternal border  GI: Bowel sounds: present; abdo soft, nondistended, nontender, no masses palpable  Extrem: Edema: minimal  Pulses: normal  Groin: no hematoma  Neuro: Alert and oriented; no focal deficit  Psych: no acute delirium noted  : voiding well  MSK: no acute findings, ranges of motion unchanged      I have personally reviewed the imaging, electrocardiogram, and pertinent lab findings    ASSESSMENT/PLAN:       79 y.o. male with multiple comorbidities presented with symptomatic severe aortic stenosis.  Symptoms include lower extremity edema and fatigue (questionable shortness of breath). Deemed intermediate risk for surgical aortic valve replacement (SAVR) due to reoperative sternotomy.  Appropriate candidate for TAVR evaluation.  We explained the patient's condition, pathology and prognosis of severe aortic stenosis, treatment options including medical therapy, SAVR and TAVR, details of SAVR and the TAVR procedure, risks and benefits of SAVR and TAVR including myocardial infarction, stroke, pacemaker, bleeding, infection, acute renal injury, conversion to open surgery, need for emergency mechanical circulatory support, and potential complications and recovery course with patient and family, and also durability of surgical vs transcatheter valve and options for reintervention in the future.  Patient and family understood and agreed to proceed. All questions answered.    I spent >70 minutes reviewing, examining and evaluating this patient, including reviewing relevant diagnostic studies, and 50% of which were spent on patient counseling including the pt's  condition, diagnosis, prognosis, expected recovery after SAVR vs TAVR, follow-up plan and next steps.

## 2019-11-29 DIAGNOSIS — I35.0 AORTIC STENOSIS, SEVERE: Primary | ICD-10-CM

## 2019-11-29 DIAGNOSIS — Z00.6 EXAMINATION OF PARTICIPANT IN CLINICAL TRIAL: ICD-10-CM

## 2019-12-02 DIAGNOSIS — Z00.6 EXAMINATION OF PARTICIPANT IN CLINICAL TRIAL: ICD-10-CM

## 2019-12-02 DIAGNOSIS — I35.0 AORTIC STENOSIS, SEVERE: Primary | ICD-10-CM

## 2019-12-09 ENCOUNTER — HOSPITAL ENCOUNTER (OUTPATIENT)
Dept: CARDIOLOGY | Facility: CLINIC | Age: 79
Discharge: HOME | End: 2019-12-09
Attending: INTERNAL MEDICINE
Payer: MEDICARE

## 2019-12-09 DIAGNOSIS — I35.0 AORTIC STENOSIS, SEVERE: ICD-10-CM

## 2019-12-09 DIAGNOSIS — Z00.6 EXAMINATION OF PARTICIPANT IN CLINICAL TRIAL: ICD-10-CM

## 2019-12-09 PROCEDURE — 93015 CV STRESS TEST SUPVJ I&R: CPT | Mod: S$GLB,,, | Performed by: INTERNAL MEDICINE

## 2019-12-09 PROCEDURE — 93015 TREADMILL STRESS TEST (CUPID ONLY): ICD-10-PCS | Mod: S$GLB,,, | Performed by: INTERNAL MEDICINE

## 2019-12-10 LAB
CV STRESS BASE HR: 65 BPM
DIASTOLIC BLOOD PRESSURE: 71 MMHG
OHS CV CPX 1 MINUTE RECOVERY HEART RATE: 86 BPM
OHS CV CPX 85 PERCENT MAX PREDICTED HEART RATE MALE: 121
OHS CV CPX ESTIMATED METS: 6
OHS CV CPX MAX PREDICTED HEART RATE: 142
OHS CV CPX PATIENT IS FEMALE: 0
OHS CV CPX PATIENT IS MALE: 1
OHS CV CPX PEAK DIASTOLIC BLOOD PRESSURE: 81 MMHG
OHS CV CPX PEAK HEAR RATE: 113 BPM
OHS CV CPX PEAK RATE PRESSURE PRODUCT: NORMAL
OHS CV CPX PEAK SYSTOLIC BLOOD PRESSURE: 176 MMHG
OHS CV CPX PERCENT MAX PREDICTED HEART RATE ACHIEVED: 80
OHS CV CPX RATE PRESSURE PRODUCT PRESENTING: 9100
STRESS ECHO POST EXERCISE DUR MIN: 9 MINUTES
STRESS ECHO POST EXERCISE DUR SEC: 40 SECONDS
STRESS ECHO TARGET HR: 121 BPM
SYSTOLIC BLOOD PRESSURE: 140 MMHG

## 2020-01-03 ENCOUNTER — RESEARCH ENCOUNTER (OUTPATIENT)
Dept: CARDIOLOGY | Facility: CLINIC | Age: 80
End: 2020-01-03

## 2020-01-03 ENCOUNTER — DOCUMENTATION ONLY (OUTPATIENT)
Dept: CARDIOLOGY | Facility: CLINIC | Age: 80
End: 2020-01-03

## 2020-01-03 NOTE — PROGRESS NOTES
The patient was consented for the Early TAVR trial but screen failed due to a history of mitral valve replacement in JAN 2008.

## 2020-01-03 NOTE — PROGRESS NOTES
TAVR Evaluation Update:    Patient was consented for Early TAVR trial, but is not a candidate due to history of mitral valve replacement. We will see him in clinic in 6 months to assess symptoms and repeat labs/echo.      Yariel Maguire is a 79 y.o. male referred by Dr. Manuelito Beverly for evaluation of severe AS (NYHA Class I sx and CCS 0).     The patient has undergone the following TAVR work-up:   · ECHO (Date 11/18/2019): RUSLAN= 0.66 cm2, MG= 43 mmHg, Peak Shree= 4.3 m/s, EF= 65%.   · LHC (Date): 2019: Normal LM and LCX.  Patent LIMA to LAD and SVG to PDA.  · STS: 1.5%   · Frailty: 0/4   · Iliacs are >7.59 on R and > 8.51on L   · LVOT area by CTA is 5.40 cm2 (30.8 mm X 22.1 mm) and Avg Diameter is 26.2 per Dr. Drummond  · Incidental findings on CT: Cholelithiasis without evidence of acute cholecystitis.  · CT Surgery risk assessment: moderate risk, per Dr. Villatoro due to redo sternotomy and co-morbidities.  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  · Comorbidities: CAD s/p CABG, HTN, and HLD     Yariel Maguire is a 29mm S3 -2cc (20% OS) valve candidate via LTF access.       Naomi Yadav PA-C  Interventional Cardiology  [facility]

## 2020-01-06 DIAGNOSIS — N18.9 CHRONIC KIDNEY DISEASE, UNSPECIFIED CKD STAGE: ICD-10-CM

## 2020-01-06 DIAGNOSIS — I35.0 NODULAR CALCIFIC AORTIC VALVE STENOSIS: Primary | ICD-10-CM

## 2020-01-06 RX ORDER — DIPHENHYDRAMINE HCL 25 MG
50 CAPSULE ORAL ONCE
Status: CANCELLED | OUTPATIENT
Start: 2020-01-06 | End: 2020-01-06

## 2020-01-06 RX ORDER — DEXTROSE MONOHYDRATE AND SODIUM CHLORIDE 5; .45 G/100ML; G/100ML
INJECTION, SOLUTION INTRAVENOUS CONTINUOUS
Status: CANCELLED | OUTPATIENT
Start: 2020-01-06

## 2020-03-16 ENCOUNTER — TELEPHONE (OUTPATIENT)
Dept: CARDIOLOGY | Facility: CLINIC | Age: 80
End: 2020-03-16

## 2020-03-16 NOTE — TELEPHONE ENCOUNTER
Patient's wife called and spoke with Katelynn Jaramillo LPN to inform her that he passed away due to a MI.

## 2020-06-08 ENCOUNTER — DOCUMENTATION ONLY (OUTPATIENT)
Dept: CARDIOLOGY | Facility: CLINIC | Age: 80
End: 2020-06-08

## 2020-06-08 NOTE — PROGRESS NOTES
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  No autopsy done. Had prior CABG MV repair and asx AS.  Was not an Early TAVR trial candidate because of the MV repair.      Wife called and I discussed the diagnoses with her.  I told her SCD usually occurs secondary to scar tissue in the heart leading to VT/VF arrest.